# Patient Record
(demographics unavailable — no encounter records)

---

## 2025-01-11 NOTE — END OF VISIT
[Time Spent: ___ minutes] : I have spent [unfilled] minutes of time on the encounter which excludes teaching and separately reported services. Warm/Dry

## 2025-01-11 NOTE — ASSESSMENT
[FreeTextEntry1] : Discussed URS  Risks, benefits and alternatives discussed. Procedure, in hospital stay and recovery explained. Risk of infection, multiple procedures, ureteral injury, ureteral stricture, incomplete stone clearance, sepsis, bleeding, and retention, all discussed.  Plan  -Bilateral ureteroscopy +/- stent insertion  -Pt aware to go to ER if she has fever -Future metabolic evaluation and possible genetic testing    I performed history/review of imaging, discussed treatment plan with patient, agree with above transcription by the KATHY

## 2025-01-11 NOTE — HISTORY OF PRESENT ILLNESS
[FreeTextEntry1] : 26 y/o F for initial urological evaluation  due to nephrolithiasis Accompanied by boyfriend [place]ian interpretation used for interview  History of kidney stones  2022 Passed one kidney stone during pregnancy. No follow up    Cholecystectomy, C section x 1 Mother & 3 siblings -Kidney stones  Never smoker   Pt had 3 days of sharp left flank pain with radiation to right flank and abdomen, +dysuria. Pt denies visible blood in urine, fever, chills. Pt still has intermittent right flank pain   Reviewed CT-Multiple non obstructing bilateral renal calculi L>R, no ureteral calculi. No hydronephrosis  Reviewed Urine culture-normal peter  Reviewed labs- CRE 0.69

## 2025-01-11 NOTE — HISTORY OF PRESENT ILLNESS
[FreeTextEntry1] : 24 y/o F for initial urological evaluation  due to nephrolithiasis Accompanied by boyfriend Turkmen interpretation used for interview  History of kidney stones  2022 Passed one kidney stone during pregnancy. No follow up    Cholecystectomy, C section x 1 Mother & 3 siblings -Kidney stones  Never smoker   Pt had 3 days of sharp left flank pain with radiation to right flank and abdomen, +dysuria. Pt denies visible blood in urine, fever, chills. Pt still has intermittent right flank pain   Reviewed CT-Multiple non obstructing bilateral renal calculi L>R, no ureteral calculi. No hydronephrosis  Reviewed Urine culture-normal peter  Reviewed labs- CRE 0.69

## 2025-02-20 NOTE — HISTORY OF PRESENT ILLNESS
[FreeTextEntry1] : 24 y/o F for kidney stone follow up S/p recent b/l URS & laser lithotripsy + two stent placement   Procedure Note:  Both ureteral stents were removed intact and in their entirety using attached string tether. Patient tolerated well. Post stent removal pain/colic cations advised.  Plan -1.5 month follow up with US/KUB and 24 hr  -Nephrology referral

## 2025-02-20 NOTE — ASSESSMENT
[FreeTextEntry1] : Plan -1.5 month follow up with US/KUB and 24 hr  -Nephrology referral    I performed history/review of imaging, discussed treatment plan with patient, agree with above transcription by the KATHY

## 2025-03-26 NOTE — HISTORY OF PRESENT ILLNESS
[FreeTextEntry1] : 26 y/o F for nephrolithiasis follow up  Accompanied by boyfriend who provided [place] interpretation  Prev CT with b/l multiple clusters of stones  Recent b/l URS, both stents removed  no stone analysis  Pt doing well. No flank pain, hematuria, dysuria. Did not pass any stone fragments   Reviewed KUB - left lower pole 3.6 mm calculus  Reviewed US-4 mm calculus in right kidney, 6 mm calculus in left kidney, Left renal cyst 1.2 cm. No hydronephrosis in either kidney.   No 24 hr. Specimen was mailed out few days ago   Plan -Pt encouraged to drink 70-80 oz of water daily  -One month follow up to review 24 hr urine results

## 2025-03-26 NOTE — ASSESSMENT
[FreeTextEntry1] : Plan -Pt encouraged to drink 70-80 oz of water daily  -One month follow up to review 24 hr urine results

## 2025-05-28 NOTE — HISTORY OF PRESENT ILLNESS
[FreeTextEntry1] : 24 y/o F for initial follow up with nephrolithiasis Accompanied by boyfriend [place]ym[place] interpretation used for interview  History of kidney stones  2022 Passed one kidney stone during pregnancy. No follow up    Cholecystectomy, C section x 1 Mother & 3 siblings -Kidney stones  Never smoker    last CT-Multiple non obstructing bilateral renal calculi L>R, no ureteral calculi. No hydronephrosis  s/p bilateral ureteroscopy see previous notes  reviewed new 24 hr: low volume, high oxalate, Cit 392, PH 6.471 also still describes intermittent right flank pain pain is intermittent, bothersome and described as 10/10 no fever associated with pain  plan: - increase fluid intake - reduce oxalate in diet, provided printed information - do not add lemon and lime - start cranberry pills - will need repeat 24 in 3 months - urine culture - needs a CT now to rule out any obstruction on the right

## 2025-05-28 NOTE — ASSESSMENT
[FreeTextEntry1] :  plan: - increase fluid intake - reduce oxalate in diet, provided printed information - do not add lemon and lime - start cranberry pills - will need repeat 24 in 3 months - urine culture - needs a CT now to rule out any obstruction on the right

## 2025-06-11 NOTE — HISTORY OF PRESENT ILLNESS
[FreeTextEntry1] : 27 y/o F for kidney stone follow up  Here with boyfriend who provide Indonesian translation s/p b/l URS & laser lithotripsy, then stent x 2 removal Feb '25 Recent 24 hr: low vol, pH 6.47, High Ox, low citrate Did not start cranberry pill yet  Reviewed CT-Non obstructing 5 mm calculus in left lower kidney. Multiple small right sided non obstructing calculi. No hydronephrosis  Likely stone not in collecting system, will only follow up for now  If stone grows in size, will consider left URS later  Pt still has intermittent right flank/back pain at times. Denies hematuria, dysuria  Plan -Start cranberry pills  -3 month follow up with US, KUB, and new 24 hr  -Nephrology referral for possible genetic testing -Refer to orthopedic for further evaluation of back pain   Dr. Cardenas was present in the office building for the entire duration of the visit, and we discussed plan of care

## 2025-06-11 NOTE — ASSESSMENT
[FreeTextEntry1] : Plan -Start cranberry pills  -3 month follow up with US, KUB, and new 24 hr  -Nephrology referral for possible genetic testing -Refer to orthopedic for further evaluation of back pain